# Patient Record
Sex: MALE | Race: WHITE | URBAN - METROPOLITAN AREA
[De-identification: names, ages, dates, MRNs, and addresses within clinical notes are randomized per-mention and may not be internally consistent; named-entity substitution may affect disease eponyms.]

---

## 2018-12-06 ENCOUNTER — HISTORICAL (OUTPATIENT)
Dept: ADMINISTRATIVE | Facility: HOSPITAL | Age: 23
End: 2018-12-06

## 2022-04-11 ENCOUNTER — HISTORICAL (OUTPATIENT)
Dept: ADMINISTRATIVE | Facility: HOSPITAL | Age: 27
End: 2022-04-11

## 2022-04-24 VITALS
HEIGHT: 75 IN | WEIGHT: 154.31 LBS | SYSTOLIC BLOOD PRESSURE: 126 MMHG | DIASTOLIC BLOOD PRESSURE: 64 MMHG | BODY MASS INDEX: 19.19 KG/M2

## 2022-05-04 NOTE — HISTORICAL [facility] CERNER
This is a historical note converted from [employer]. Formatting and pictures may have been removed.  Please reference [employer] for original formatting and attached multimedia. Chief Complaint  Pt is here today for Left knee pain. Pt states hes been having problems since he was 14 yrs old. Pt states the pain is getting worse states it often gives out on him causing his to fall, states a lot of popping to his knee.  History of Present Illness  23-year-old male presents today for evaluation of left knee injury. ?Patient sustained this while playing football about 9 years ago. ?He was hit?in his left knee. ?Is noted popping catching locking in the knee giving way?since that time. ?He is tried bracing. ?He is tried?anti-inflammatories. ?All this is failed to relieve his symptoms. ?Most recently was going down some stairs his knee?give way?and he is sustained a fall. ?He notes the knee does lock at times as well. ?Pain is isolated?to his anterior medial knee. ?Denies any radiations.  Review of Systems  Denies fevers, chills, chest pain, shortness of breath. Comprehensive review of systems performed and otherwise negative except as noted in HPI.  Physical Exam  Vitals & Measurements  BP:?126/64?  HT:?190?cm? HT:?190?cm? HT:?190?cm? WT:?70?kg? WT:?70?kg? WT:?70?kg? BMI:?19.39?  General: No acute distress, alert and oriented, healthy appearing?  HEENT: Head is atraumatic, mucous membranes are moist  Neck: Supples, no JVD  Cardiovascular: Palpable dorsalis pedis and posterior tibial pulses, regular rate and rhythm to those pulses  Lungs: Breathing non-labored  Skin: no rashes appreciated  Neurologic: Can flex and extend knees, ankles, and toes. Sensation is grossly intact  ?  Focused Orthopedic Exam:?  Left knee without wound or skin breakdown.  No joint effusion  tenderness to palpation about the medial joint line mild  ROM from 0 extension to 130 flexion  stable to varus/valgus. ?Slight laxity anterior drawer. ?Solid posterior  drawer.? Positive Lockmans  Positive McMurrays with recreation of symptoms  No crepitus to ROM  ?  Assessment/Plan  1.?Tear of meniscus of left knee?S83.207A  ?Patient symptoms of meniscal tear left knee versus ACL tear. ?Think he likely has a ligamentous injury to his left knee. ?Plan to get an MRI of his left knee for further evaluation of his meniscus as well as his ligaments integrity. ?We will plan to see him back in 2 weeks.  Ordered:  Clinic Follow up, *Est. 12/20/18 3:00:00 CST, Order for future visit, Tear of meniscus of left knee, [facility]  MRI Ext Lower Joint Left W/O Contrast, Routine, *Est. 12/06/18 3:00:00 CST, Injury, knee & below, None, Ambulatory, Rad Type, Order for future visit, Tear of meniscus of left knee, Schedule this test, [facility], *Est. 12/06/18 3:00:00 CST  Office/Outpatient Visit Level 4 New [id number] PC, Tear of meniscus of left knee, [facility], 12/06/18 10:42:00 CST  ?  Knee pain?M25.569  ?   Problem List/Past Medical History  Ongoing  No chronic problems  Historical  No qualifying data  Medications  Adderall 20 mg oral tablet, 20 mg= 1 tab(s), Oral, BID  Allegra,? ?Not taking  Artificial Tears preserved ophthalmic solution, See Instructions, PRN,? ?Not taking  Lacri-Lube S.O.P. ophthalmic ointment, 1 tanja, OPTH, QID, PRN,? ?Not taking  Tylenol,? ?Not taking  Allergies  gentamicin  Social History  Tobacco  Never smoker, No, 12/06/2018  Family History  Family history is negative  Health Maintenance  Health Maintenance  ???Pending?(in the next year)  ??? ??Due?  ??? ? ? ?ADL Screening due??12/06/18??and every 1??year(s)  ??? ? ? ?Alcohol Misuse Screening due??12/06/18??and every 1??year(s)  ??? ? ? ?Tetanus Vaccine due??12/06/18??and every 10??year(s)  ???Satisfied?(in the past 1 year)  ??? ??Satisfied?  ??? ? ? ?Blood Pressure Screening on??12/06/18.??Satisfied by Alejandra Ledesma  ??? ? ? ?Body Mass Index Check on??12/06/18.??Satisfied by  Alejandra Ledesma  ??? ? ? ?Depression Screening on??12/06/18.??Satisfied by Alejandra Ledesma  ??? ? ? ?Influenza Vaccine on??12/06/18.??Satisfied by Alejandra Ledesma  ??? ? ? ?Obesity Screening on??12/06/18.??Satisfied by Alejandra Ledesma  ?  ?  Diagnostic Results  AP lateral?bilateral knees taken reviewed. ?Patient with well-maintained joint space. ?No significant?noted.